# Patient Record
Sex: FEMALE | Race: WHITE | NOT HISPANIC OR LATINO | URBAN - METROPOLITAN AREA
[De-identification: names, ages, dates, MRNs, and addresses within clinical notes are randomized per-mention and may not be internally consistent; named-entity substitution may affect disease eponyms.]

---

## 2023-12-12 ENCOUNTER — OFFICE VISIT (OUTPATIENT)
Dept: SURGERY | Facility: CLINIC | Age: 63
End: 2023-12-12
Payer: COMMERCIAL

## 2023-12-12 DIAGNOSIS — K43.9 VENTRAL HERNIA WITHOUT OBSTRUCTION OR GANGRENE: ICD-10-CM

## 2023-12-12 LAB
NON-UH HIE BUN: 16 MG/DL (ref 9–23)
NON-UH HIE CREATININE: 0.8 MG/DL (ref 0.5–0.8)
NON-UH HIE GFR AA: >60
NON-UH HIE GLOMERULAR FILTRATION RATE: >60 ML/MIN/1.73M?

## 2023-12-12 PROCEDURE — 99203 OFFICE O/P NEW LOW 30 MIN: CPT | Performed by: PHYSICIAN ASSISTANT

## 2023-12-12 RX ORDER — LANSOPRAZOLE 30 MG/1
CAPSULE, DELAYED RELEASE ORAL
COMMUNITY
Start: 2003-12-11

## 2023-12-12 RX ORDER — LISINOPRIL 10 MG/1
TABLET ORAL
COMMUNITY
Start: 2012-09-25

## 2023-12-12 RX ORDER — DULOXETIN HYDROCHLORIDE 30 MG/1
CAPSULE, DELAYED RELEASE ORAL
COMMUNITY
Start: 2013-10-30

## 2023-12-12 RX ORDER — ASPIRIN 81 MG/1
81 TABLET ORAL DAILY
COMMUNITY

## 2023-12-12 RX ORDER — FUROSEMIDE 20 MG/1
20 TABLET ORAL
COMMUNITY
Start: 2012-09-25 | End: 2024-11-10

## 2023-12-12 RX ORDER — OMEPRAZOLE 20 MG/1
CAPSULE, DELAYED RELEASE ORAL
COMMUNITY
Start: 2013-09-30

## 2023-12-12 RX ORDER — SIMVASTATIN 10 MG/1
TABLET, FILM COATED ORAL
COMMUNITY
Start: 2023-10-04

## 2023-12-12 RX ORDER — FAMOTIDINE 20 MG/1
20 TABLET, FILM COATED ORAL
COMMUNITY
Start: 2003-12-11

## 2023-12-12 NOTE — PROGRESS NOTES
Subjective   Patient ID: Shira Hugo is a 63 y.o. female who presents for Hernia (UMBILICAL HERNIA).    HPI  Is a 63-year-old female comes in because she believes she has a umbilical hernia.  She states it started before COVID and then COVID hit and she did not want to come to near a hospital so she never came to now she thinks is getting bigger.  Does not cause her any pain right now it is in the upper abdominal areas which she is pointing to not her umbilicus.  But she states is getting much larger.    Review of Systems  Negative other than mentioned in HPI    ENT: No earache, no sore throat, no nosebleeds  Cardiovascular: No chest pain, no shortness of breath, no leg pain, no edema  Respiratory: No shortness of breath on exertion, no wheezing  Gastrointestinal: No abdominal pain, no melena, no nausea, vomiting and/or diarrhea  Musculoskeletal: No pain moving all extremities, no back pain ambulating normally  Skin: No rashes, no lesions, and no skin changes  Neuro: No headache, no confusion, no numbness and tingling  Psychiatric, normal mood, not suicidal, not homicidal, feeling good          Physical Exam  Eyes: Conjunctiva non -icteric and eye lids are without obvious rash or drooping. Pupils are symmetric.   Ears, Nose, Mouth, and Throat: External ears and nose appear to be without deformity or rash. No lesions or masses noted. Hearing is grossly intact.   Neck:. No JVD noted, tracheal position is midline. No thyromegaly, no thyroid nodules  Head and Face: Examination of the head and face revealed no abnormalities.   Respiratory: No gasping or shortness of breath noted, no use of accessory muscles noted. Clear to auscultate bilaterally  Cardiovascular: Examination for edema is normal. Regular rate and rhythm S1 S2 without murmurs  GI there does not appear to be an umbilical hernia present possibly about 2 to 3 inches above the umbilicus there may be a ventral hernia it is difficult to assess due to the  patient's body mass.  Skin: No rashes or open lesions/ulcers identified on skin.   Musk: Digits/nails show no clubbing or cyanosis. No asymmetry or masses noted of the musculature. Examination of the muscles/joints/bones show normal range of motion. Gait is grossly normally.   Neurologic: Cranial nerves II- XII intact, motor strength 5/5 muscle strength of the lower extremities bilaterally and equal.      Objective     No diagnosis found.   There is no problem list on file for this patient.     Allergies   Allergen Reactions    Sulfa (Sulfonamide Antibiotics) Unknown and Shortness of breath     palpatations, rash    Tramadol Itching, Shortness of breath and Palpitations      Medication Documentation Review Audit       Reviewed by Danya Chilel MA (Medical Assistant) on 12/12/23 at 1122      Medication Order Taking? Sig Documenting Provider Last Dose Status   DULoxetine (Cymbalta) 30 mg DR capsule [id number] Yes Take by mouth. Historical Provider, MD Taking Active   furosemide (Lasix) 20 mg tablet [id number] Yes 1 tablet (20 mg). Historical Provider, MD Taking Active   lansoprazole (Prevacid) 30 mg DR capsule [id number] Yes Take one(1) capsule twice daily. Historical Provider, MD Taking Active   lisinopril 10 mg tablet [id number] Yes See Instructions, TAKE 1 TABLET DAILY, 90 TB, Date: 11/16/23 11:44:00 AM EST, [facility], Instructions Replace Required Details, TAKE 1 TABLET DAILY Historical MD Adams Taking Active   omeprazole (PriLOSEC) 20 mg DR capsule [id number] Yes Take by mouth. Historical MD Adams Taking Active   Pepcid 20 mg tablet [id number] Yes 1 tablet (20 mg). Historical Provider, MD Taking Active   simvastatin (Zocor) 10 mg tablet [id number] Yes  Historical Provider, MD Taking Active                    History reviewed. No pertinent past medical history.  Social History     Tobacco Use   Smoking Status Unknown   Smokeless Tobacco Not on file     No family history on file.   Past Surgical  History:   Procedure Laterality Date    APPENDECTOMY  09/30/2013    Appendectomy    CHOLECYSTECTOMY  09/30/2013    Cholecystectomy    COLONOSCOPY  09/30/2013    Complete Colonoscopy    ESOPHAGOGASTRODUODENOSCOPY  09/30/2013    Diagnostic Esophagogastroduodenoscopy       Assessment/Plan   Patient will require CT of the abdomen to rule out ventral hernia.  Once that is completed please return to the office for any surgical discussion that may need to happen.    Encounter Diagnosis   Name Primary?    Ventral hernia without obstruction or gangrene        I have reviewed all data including labs,radiologic and previous reports.    **Portions of this medical record have been created using voice recognition software and may have minor errors which are inherent in voice recognition systems. It has not been fully edited for typographical or grammatical errors**

## 2024-01-10 ENCOUNTER — OFFICE VISIT (OUTPATIENT)
Dept: SURGERY | Facility: CLINIC | Age: 64
End: 2024-01-10
Payer: COMMERCIAL

## 2024-01-10 DIAGNOSIS — K43.9 VENTRAL HERNIA WITHOUT OBSTRUCTION OR GANGRENE: Primary | ICD-10-CM

## 2024-01-10 PROCEDURE — 99214 OFFICE O/P EST MOD 30 MIN: CPT | Performed by: PHYSICIAN ASSISTANT

## 2024-01-10 NOTE — PROGRESS NOTES
Subjective   Patient ID: Shira Hugo is a 63 y.o. female who presents for No chief complaint on file..    HPI  63-year-old female comes in for review of CT of the abdomen to rule out hernia.  Patient has a defect in her mid epigastric region above her umbilicus it has been there and it bulges out.  Bulges out more when she is standing or when she lays down and lifts her head off the bed.  It causes her some discomfort especially if she sits too long      Review of Systems  Negative other than mentioned in HPI    ENT: No earache, no sore throat, no nosebleeds  Cardiovascular: No chest pain, no shortness of breath, no leg pain, no edema  Respiratory: No shortness of breath on exertion, no wheezing  Gastrointestinal: No abdominal pain, no melena, no nausea, vomiting and/or diarrhea  Musculoskeletal: No pain moving all extremities, no back pain ambulating normally  Skin: No rashes, no lesions, and no skin changes  Neuro: No headache, no confusion, no numbness and tingling  Psychiatric, normal mood, not suicidal, not homicidal, feeling good        Physical Exam  Eyes: Conjunctiva non -icteric and eye lids are without obvious rash or drooping. Pupils are symmetric.   Ears, Nose, Mouth, and Throat: External ears and nose appear to be without deformity or rash. No lesions or masses noted. Hearing is grossly intact.   Neck:. No JVD noted, tracheal position is midline. No thyromegaly, no thyroid nodules  Head and Face: Examination of the head and face revealed no abnormalities.   Respiratory: No gasping or shortness of breath noted, no use of accessory muscles noted. Clear to auscultate bilaterally  Cardiovascular: Examination for edema is normal. Regular rate and rhythm S1 S2 without murmurs  GI: Abdomen non tender to palpation, bowel sounds present no no hepatomegaly, when patient is standing there is a defect above her umbilicus.  Most likely a ventral hernia.  And when she lies down on the bed there is a deformity as well  I do not think it is associated with diastases recti  Skin: No rashes or open lesions/ulcers identified on skin.   Musk: Digits/nails show no clubbing or cyanosis. No asymmetry or masses noted of the musculature. Examination of the muscles/joints/bones show normal range of motion. Gait is grossly normally.   Neurologic: Cranial nerves II- XII intact, motor strength 5/5 muscle strength of the lower extremities bilaterally and equal.      Objective     No diagnosis found.   There is no problem list on file for this patient.     Allergies   Allergen Reactions    Sulfa (Sulfonamide Antibiotics) Unknown and Shortness of breath     palpatations, rash    Tramadol Itching, Shortness of breath and Palpitations      Medication Documentation Review Audit       Reviewed by Danya Chilel MA (Medical Assistant) on 12/12/23 at 1122      Medication Order Taking? Sig Documenting Provider Last Dose Status   DULoxetine (Cymbalta) 30 mg DR capsule [id number] Yes Take by mouth. Historical Provider, MD Taking Active   furosemide (Lasix) 20 mg tablet [id number] Yes 1 tablet (20 mg). Historical Provider, MD Taking Active   lansoprazole (Prevacid) 30 mg DR capsule [id number] Yes Take one(1) capsule twice daily. Historical Provider, MD Taking Active   lisinopril 10 mg tablet [id number] Yes See Instructions, TAKE 1 TABLET DAILY, 90 TB, Date: 11/16/23 11:44:00 AM EST, [facility], Instructions Replace Required Details, TAKE 1 TABLET DAILY Historical Provider, MD Taking Active   omeprazole (PriLOSEC) 20 mg DR capsule [id number] Yes Take by mouth. Historical Provider, MD Taking Active   Pepcid 20 mg tablet [id number] Yes 1 tablet (20 mg). Historical Provider, MD Taking Active   simvastatin (Zocor) 10 mg tablet [id number] Yes  Historical Provider, MD Taking Active                    No past medical history on file.  Social History     Tobacco Use   Smoking Status Unknown   Smokeless Tobacco Not on file     Family History   Problem Relation  Name Age of Onset    Hernia Father        Past Surgical History:   Procedure Laterality Date    APPENDECTOMY  09/30/2013    Appendectomy    CATARACT EXTRACTION, BILATERAL      CHOLECYSTECTOMY  09/30/2013    Cholecystectomy    COLONOSCOPY  09/30/2013    Complete Colonoscopy    ESOPHAGOGASTRODUODENOSCOPY  09/30/2013    Diagnostic Esophagogastroduodenoscopy       Assessment/Plan   CT report states that there is no hernias present on the patient's that to her abdominal wall.  There is a defect on physical exam I will have the CT reviewed by Dr. Zepeda.  I did discuss with the patient laparoscopic ventral hernia repair.  I told her I would call her tomorrow once Dr. Zepeda reviews the CT to decide whether she needs any surgical repair    Today we had a discussion about ventral hernia  repair.  Patient was instructed this would be done laparoscopically through 4 small incisions/ possible opened procedure.  Most likely a mesh will be inserted, patient instructed to general anesthesia.  Patient was instructed it would take 1 hour.  Patient will require a ride to and from the hospital.  Risk and benefits such as bleeding and infection were discussed.  Alternative measures were discussed with patient.  All questions were answered.  Patient would like to proceed     Encounter Diagnosis   Name Primary?    Ventral hernia without obstruction or gangrene Yes     I have reviewed all data including labs,radiologic and previous reports.      Marivel Sarah PA-C